# Patient Record
(demographics unavailable — no encounter records)

---

## 2024-11-15 NOTE — DISCUSSION/SUMMARY
[FreeTextEntry1] : stable exam HTN stable on meds/ lifestyle changes Lipids stable on statin palpitations stable, controlled [EKG obtained to assist in diagnosis and management of assessed problem(s)] : EKG obtained to assist in diagnosis and management of assessed problem(s)

## 2024-11-15 NOTE — PHYSICAL EXAM
[Well Developed] : well developed [Well Nourished] : well nourished [No Acute Distress] : no acute distress [Normal Conjunctiva] : normal conjunctiva [Normal Venous Pressure] : normal venous pressure [No Carotid Bruit] : no carotid bruit [Normal S1, S2] : normal S1, S2 [No Murmur] : no murmur [No Rub] : no rub [No Gallop] : no gallop [Clear Lung Fields] : clear lung fields [Good Air Entry] : good air entry [No Respiratory Distress] : no respiratory distress  [Soft] : abdomen soft [Non Tender] : non-tender [No Masses/organomegaly] : no masses/organomegaly [Normal Bowel Sounds] : normal bowel sounds [Normal Gait] : normal gait [No Edema] : no edema [No Cyanosis] : no cyanosis [No Clubbing] : no clubbing [No Rash] : no rash [Moves all extremities] : moves all extremities [No Focal Deficits] : no focal deficits [Normal Speech] : normal speech [Alert and Oriented] : alert and oriented [Normal memory] : normal memory 25

## 2025-04-05 NOTE — ASSESSMENT
[FreeTextEntry1] : 68 yo female with h/o HTN, HLD, palpitations, mild valvular disease here to re-establish cardiac care.  #Chest pain - atypical, dyspnea  --CP appears pleuritic in nature- s/p recent treatment for PNA/URI --continued conservative therapies for cough --will re-assess next visit and consider testing if persistence --hydration as tolerated emphasized --no sig CAD per CCTA in 03/2024 --continued CV Risk factor modification   # Hypertension -  borderline elevated today - continue current anti-hypertensives - home BP monitoring encouraged - continued exercise as tolerated - Low salt diet  # Hyperlipidemia -  Sub-optimally Controlled, Aim for a LDL of <100 - Continue current prescribed medications inc crestor; will up-titrate as needed - Low fat low cholesterol diet - Heart healthy diet emphasized - Continued exercise as tolerated  Follow up in 3 months to re-assess symptoms

## 2025-04-05 NOTE — HISTORY OF PRESENT ILLNESS
[FreeTextEntry1] : 68 yo female with h/o HTN, HLD, palpitations, mild valvular disease here to re-establish cardiac care. She reports being in [place] two weeks ago. She notes working in the heat while she was there and chemical exposure that left her feeling sick. Last Friday, she felt shaky, palpitations and tremors while she was working outside. She checked her pulse it was in the 90s all day. Following that she had a hoarse voice and palpitations, that was treated with antibiotics and albuterol in [place]. Since she returned to the [place], she has not had any further symptoms. Her HR returned to 40s-50s which is normal for her. She notes a chest discomfort that is more noticeable when she takes a deep breath in and increased shortness of breath. In addition, she has been holding ozempic for the last 2 weeks. Denies any significant chest pain/dyspnea on exertion, palpitations, orthopnea, LE edema, PND or syncope.   Cardiac Workup: EKG today: sinus nazanin 48bpm; no sig ST-T changes, unchanged from prior TTE 01/2024: normal LV size/function. LVEF 61%, normal RV, no pulm HTN, mild MR Stress test 01/2024: Abnormal stress electrocardiography. 1 mm horizontal ST depressions in II, III, aVF. Abnormal stress echocardiography. Hypokinesis in the basal to mid inferolateral and basal to mid anterolateral regions immediate post-stress. Exercise time 8 min 35 sec (10.1 METs), 96% MPHR achieved. CCTA 03/2024: calcium score 0; no sig CAD, 3.5cm hiatal hernia Lipids: Tchol 210, HDL 52, ,

## 2025-04-05 NOTE — HISTORY OF PRESENT ILLNESS
[FreeTextEntry1] : 70 yo female with h/o HTN, HLD, palpitations, mild valvular disease here to re-establish cardiac care. She reports being in [place] two weeks ago. She notes working in the heat while she was there and chemical exposure that left her feeling sick. Last Friday, she felt shaky, palpitations and tremors while she was working outside. She checked her pulse it was in the 90s all day. Following that she had a hoarse voice and palpitations, that was treated with antibiotics and albuterol in [place]. Since [place] the [place], she has not had any further symptoms. Her HR returned to 40s-50s which is normal for her. She notes a chest discomfort that is more noticeable when she takes a deep breath in and increased shortness of breath. In addition, she has been holding ozempic for the last 2 weeks. Denies any significant chest pain/dyspnea on exertion, palpitations, orthopnea, LE edema, PND or syncope.   Cardiac Workup: EKG today: sinus nazanin 48bpm; no sig ST-T changes, unchanged from prior TTE 01/2024: normal LV size/function. LVEF 61%, normal RV, no pulm HTN, mild MR Stress test 01/2024: Abnormal stress electrocardiography. 1 mm horizontal ST depressions in II, III, aVF. Abnormal stress echocardiography. Hypokinesis in the basal to mid inferolateral and basal to mid anterolateral regions immediate post-stress. Exercise time 8 min 35 sec (10.1 METs), 96% MPHR achieved. CCTA 03/2024: calcium score 0; no sig CAD, 3.5cm hiatal hernia Lipids: Tchol 210, HDL 52, ,

## 2025-04-05 NOTE — PHYSICAL EXAM
[Well Developed] : well developed [Well Nourished] : well nourished [No Acute Distress] : no acute distress [Normal Conjunctiva] : normal conjunctiva [Normal Venous Pressure] : normal venous pressure [No Carotid Bruit] : no carotid bruit [Normal S1, S2] : normal S1, S2 [No Murmur] : no murmur [No Rub] : no rub [No Gallop] : no gallop [Clear Lung Fields] : clear lung fields [Good Air Entry] : good air entry [No Respiratory Distress] : no respiratory distress  [Soft] : abdomen soft [Non Tender] : non-tender [Normal Bowel Sounds] : normal bowel sounds [Normal Gait] : normal gait [No Edema] : no edema [No Cyanosis] : no cyanosis [No Varicosities] : no varicosities [No Rash] : no rash [No Skin Lesions] : no skin lesions [Moves all extremities] : moves all extremities [No Focal Deficits] : no focal deficits [Normal Speech] : normal speech [Alert and Oriented] : alert and oriented [Normal memory] : normal memory

## 2025-04-05 NOTE — ASSESSMENT
[FreeTextEntry1] : 70 yo female with h/o HTN, HLD, palpitations, mild valvular disease here to re-establish cardiac care.  #Chest pain - atypical, dyspnea  --CP appears pleuritic in nature- s/p recent treatment for PNA/URI --continued conservative therapies for cough --will re-assess next visit and consider testing if persistence --hydration as tolerated emphasized --no sig CAD per CCTA in 03/2024 --continued CV Risk factor modification   # Hypertension -  borderline elevated today - continue current anti-hypertensives - home BP monitoring encouraged - continued exercise as tolerated - Low salt diet  # Hyperlipidemia -  Sub-optimally Controlled, Aim for a LDL of <100 - Continue current prescribed medications inc crestor; will up-titrate as needed - Low fat low cholesterol diet - Heart healthy diet emphasized - Continued exercise as tolerated  Follow up in 3 months to re-assess symptoms

## 2025-04-25 NOTE — PHYSICAL EXAM
[Well Developed] : well developed [Well Nourished] : well nourished [No Acute Distress] : no acute distress [Normal Conjunctiva] : normal conjunctiva [Normal Venous Pressure] : normal venous pressure [No Carotid Bruit] : no carotid bruit [No Murmur] : no murmur [No Rub] : no rub [No Gallop] : no gallop [Tachycardia] : tachycardic [Irregularly Irregular] : irregularly irregular [Clear Lung Fields] : clear lung fields [Good Air Entry] : good air entry [No Respiratory Distress] : no respiratory distress  [Soft] : abdomen soft [Non Tender] : non-tender [Normal Bowel Sounds] : normal bowel sounds [Normal Gait] : normal gait [No Edema] : no edema [No Cyanosis] : no cyanosis [No Varicosities] : no varicosities [No Rash] : no rash [No Skin Lesions] : no skin lesions [Moves all extremities] : moves all extremities [No Focal Deficits] : no focal deficits [Normal Speech] : normal speech [Alert and Oriented] : alert and oriented [Normal memory] : normal memory [Soft, Nontender] : the abdomen was soft and nontender

## 2025-04-25 NOTE — HISTORY OF PRESENT ILLNESS
[FreeTextEntry1] : 70 yo female with h/o newly diagnosed paroxysmal AF, HTN, HLD, DM II, palpitations, mild valvular disease here for follow up cardiac care. Dr. DAVINA Montelongo present at the time of patient examination, Dr. Argueta notified Recently started on Eliquis for pAF earlier this week (0.1% on Holter, longest episode 1.2m, max HR 152bpm - [id number] of 4); she began with nausea and headache x 2 days, yesterday worsening headache, nausea and vomiting Went to the ER at [facility] last night, was hydrated, had EKG and head CT which were deemed unremarkable Now today had one episode of vomiting at 7am, vomited x 1 here in the office (no hematemesis)  She reports fatigue, palpitations and nausea Denies hematemesis or blood in the stool, last BM was last night and patient noted that it was normal Denies abdominal pain or cramping, no abdominal distention or TTP. No diarrhea or constipation. No chest pain, dizziness or focal weakness. States that headache has persisted, however is improved from yesterday In addition, she has been holding ozempic for the last 2 weeks, her endocrinologist was tapering it down to wean her off if it  Patient has prior hx of chemical exposure that left her feeling sick while in [place], she reported prior symptoms of feeling shaky, palpitations and tremors while she was working outside. She checked her pulse it was in the 90s all day. Following that she had a hoarse voice and palpitations, that was treated with antibiotics and albuterol in [place]. Since she returned to the [place], and her HR returned to 40s-50s which is normal for her. She notes a chest discomfort that is more noticeable when she takes a deep breath in and increased shortness of breath. Denies any significant chest pain/dyspnea on exertion, orthopnea, LE edema, PND or syncope.   Cardiac Workup: EKG today: sinus nazanin 48bpm; no sig ST-T changes, unchanged from prior TTE 01/2024: normal LV size/function. LVEF 61%, normal RV, no pulm HTN, mild MR Stress test 01/2024: Abnormal stress electrocardiography. 1 mm horizontal ST depressions in II, III, aVF. Abnormal stress echocardiography. Hypokinesis in the basal to mid inferolateral and basal to mid anterolateral regions immediate post-stress. Exercise time 8 min 35 sec (10.1 METs), 96% MPHR achieved. CCTA 03/2024: calcium score 0; no sig CAD, 3.5cm hiatal hernia Lipids: Tchol 210, HDL 52, ,

## 2025-04-25 NOTE — ASSESSMENT
[FreeTextEntry1] : 68 yo female with h/o newly diagnosed paroxysmal AF, HTN, HLD, DM II, palpitations, mild valvular disease here for follow up cardiac care. Dr. Montelongo present at the time of patient examination, Dr. Argueta notified  #AF w/ RVR --pt hemodynamically stable, repeat /80, alert and responsive, normal gait --recommended ambulance transport, patient declined and preferred her  to drive her to the ED --patient preferred [facility] for further evaluation and management, report given to ED triage --likely in the setting of vomiting/dehydration --c/w NOAC stroke prophylaxis given [id number] of 4  #Hx Chest pain that was atypical w/ dyspnea, none today --no sig CAD per CCTA in 03/2024 --continued CV Risk factor modification   # Hypertension - continue current anti-hypertensives - home BP monitoring encouraged - continued exercise as tolerated - Low salt diet  # Hyperlipidemia - Sub-optimally Controlled, Aim for a LDL of <100 - Continue Crestor, now at 10mg daily - recheck lipids in 2-3 months - Low fat low cholesterol diet - Heart healthy diet emphasized - Continued exercise as tolerated  ER evaluation today Follow up with Dr. Argueta on 5/7/25 EP consult on 5/9/25

## 2025-04-25 NOTE — REVIEW OF SYSTEMS
[Negative] : Neurological [Nausea] : nausea [Vomiting] : vomiting [Abdominal Pain] : no abdominal pain [Heartburn] : no heartburn [Change in Appetite] : no change in appetite [Change In The Stool] : no change in stool [Dysphagia] : no dysphagia [Diarrhea] : diarrhea [Constipation] : no constipation [Blood in Stool] : no blood in stool [FreeTextEntry5] : as per HPI

## 2025-05-04 NOTE — PHYSICAL EXAM
[Well Developed] : well developed [Well Nourished] : well nourished [No Acute Distress] : no acute distress [Normal Conjunctiva] : normal conjunctiva [Normal Venous Pressure] : normal venous pressure [No Carotid Bruit] : no carotid bruit [No Murmur] : no murmur [No Rub] : no rub [No Gallop] : no gallop [Tachycardia] : tachycardic [Irregularly Irregular] : irregularly irregular [Clear Lung Fields] : clear lung fields [Good Air Entry] : good air entry [No Respiratory Distress] : no respiratory distress  [Non Tender] : non-tender [Soft] : abdomen soft [Normal Bowel Sounds] : normal bowel sounds [Soft, Nontender] : the abdomen was soft and nontender [Normal Gait] : normal gait [No Edema] : no edema [No Cyanosis] : no cyanosis [No Varicosities] : no varicosities [No Rash] : no rash [No Skin Lesions] : no skin lesions [Moves all extremities] : moves all extremities [No Focal Deficits] : no focal deficits [Alert and Oriented] : alert and oriented [Normal Speech] : normal speech [Normal memory] : normal memory

## 2025-05-04 NOTE — PHYSICAL EXAM
[Well Developed] : well developed [Well Nourished] : well nourished [No Acute Distress] : no acute distress [Normal Conjunctiva] : normal conjunctiva [Normal Venous Pressure] : normal venous pressure [No Carotid Bruit] : no carotid bruit [No Murmur] : no murmur [No Rub] : no rub [No Gallop] : no gallop [Tachycardia] : tachycardic [Irregularly Irregular] : irregularly irregular [Clear Lung Fields] : clear lung fields [Good Air Entry] : good air entry [No Respiratory Distress] : no respiratory distress  [Soft] : abdomen soft [Non Tender] : non-tender [Normal Bowel Sounds] : normal bowel sounds [Soft, Nontender] : the abdomen was soft and nontender [Normal Gait] : normal gait [No Edema] : no edema [No Cyanosis] : no cyanosis [No Varicosities] : no varicosities [No Rash] : no rash [No Skin Lesions] : no skin lesions [Moves all extremities] : moves all extremities [No Focal Deficits] : no focal deficits [Alert and Oriented] : alert and oriented [Normal Speech] : normal speech [Normal memory] : normal memory

## 2025-05-08 NOTE — ASSESSMENT
[FreeTextEntry1] : 70 yo female with h/o newly diagnosed paroxysmal AF, HTN, HLD, DM II, palpitations, mild valvular disease here for follow up cardiac care.  #Paroxysmal atrial fibrillation --continue beta blocker  --c/w NOAC stroke prophylaxis given [id number] of 4 --overall low burden, however had additional episodes of A.fib with RVR since the holter --EP eval pending --recommended to stop ozempic given GI symptoms since initiation   # Hypertension - well controlled - continue current anti-hypertensives - home BP monitoring encouraged - continued exercise as tolerated - Low salt diet  # Hyperlipidemia - Sub-optimally Controlled, Aim for a LDL of <100 - Continue Crestor, now at 10mg daily - recheck lipids prior to next visit - Low fat low cholesterol diet emphasized - Heart healthy, Mediterranean diet discussed - Continued exercise as tolerated  Follow up in 3 months with labs

## 2025-05-08 NOTE — ASSESSMENT
[FreeTextEntry1] : 70 yo female with h/o newly diagnosed paroxysmal AF, HTN, HLD, DM II, palpitations, mild valvular disease here for follow up cardiac care.  #Paroxysmal atrial fibrillation --continue beta blocker  --c/w NOAC stroke prophylaxis given [id number] of 4 --overall low burden, however had additional episodes of A.fib with RVR since the holter --EP eval pending --recommended to stop ozempic given GI symptoms since initiation   # Hypertension - well controlled - continue current anti-hypertensives - home BP monitoring encouraged - continued exercise as tolerated - Low salt diet  # Hyperlipidemia - Sub-optimally Controlled, Aim for a LDL of <100 - Continue Crestor, now at 10mg daily - recheck lipids prior to next visit - Low fat low cholesterol diet emphasized - Heart healthy, Mediterranean diet discussed - Continued exercise as tolerated  Follow up in 3 months with labs

## 2025-05-08 NOTE — REVIEW OF SYSTEMS
[Nausea] : nausea [Vomiting] : vomiting [Negative] : Neurological [Abdominal Pain] : no abdominal pain [Heartburn] : no heartburn [Change in Appetite] : no change in appetite [Change In The Stool] : no change in stool [Dysphagia] : no dysphagia [Diarrhea] : diarrhea [Constipation] : no constipation [Blood in Stool] : no blood in stool [FreeTextEntry5] : as per HPI

## 2025-05-08 NOTE — HISTORY OF PRESENT ILLNESS
[FreeTextEntry1] : 68 yo female with h/o newly diagnosed paroxysmal AF, HTN, HLD, DM II, palpitations, mild valvular disease here for follow up cardiac care. Recently started on Eliquis/BB for pAF earlier this month after she had symptoms during a recent visit to [place].  In the interim she has been having nausea/vomiting that triggers A.fib with RVR. Last seen in the office a week ago after she thought her nausea/vomiting was 2/2 eliquis. She was sent to the ER for A.fib with RVR where she converted after 1 dose of diltiazem. She returns for follow up. She has stopped taking ozempic. Seems to be helping her GI symptoms of N/V. Underwent CT A/P while in the ER - notable for colitis. Discharged without antibiotics. Feels improved; denies any fevers, chills or GI symptoms. She has resumed eliquis and is tolerating. Was recommended to increase beta blocker; however has not done it due to low resting HR (in the 40s-50s per patient). Scheduled to see EP next week. Denies any significant chest pain/dyspnea on exertion, orthopnea, LE edema, PND or syncope.    Cardiac Workup: EKG today: sinus nazanin 48bpm; no sig ST-T changes, unchanged from prior TTE 01/2024: normal LV size/function. LVEF 61%, normal RV, no pulm HTN, mild MR Stress test 01/2024: Abnormal stress electrocardiography. 1 mm horizontal ST depressions in II, III, aVF. Abnormal stress echocardiography. Hypokinesis in the basal to mid inferolateral and basal to mid anterolateral regions immediate post-stress. Exercise time 8 min 35 sec (10.1 METs), 96% MPHR achieved. CCTA 03/2024: calcium score 0; no sig CAD, 3.5cm hiatal hernia Lipids 03/2025: Tchol 201, HDL 52, ,  holter: 0.1% A.fib on Holter, longest episode 1.2m, max HR 152bpm - [id number] of 4

## 2025-05-08 NOTE — REVIEW OF SYSTEMS
[Nausea] : nausea [Vomiting] : vomiting [Negative] : Neurological [Abdominal Pain] : no abdominal pain [Heartburn] : no heartburn [Change in Appetite] : no change in appetite [Dysphagia] : no dysphagia [Change In The Stool] : no change in stool [Diarrhea] : diarrhea [Constipation] : no constipation [Blood in Stool] : no blood in stool [FreeTextEntry5] : as per HPI

## 2025-05-08 NOTE — HISTORY OF PRESENT ILLNESS
Spoke to pt and notified pt of appt date and time for HFU and chemo clear         ----- Message from MARCELO Colmenares sent at 3/24/2025  3:33 PM CDT -----  Regarding: FW: hosp follow up  Scheduled. Please call her to let her know. Labs this Friday and visit next Monday.  ----- Message -----  From: Wilma Knapp RN  Sent: 3/24/2025   2:18 PM CDT  To: [facility] Staff  Subject: hosp follow up                                   Good afternoon!Patient is discharging from [facility] today and needs a hospital follow up.Please reach out to patient to schedule.Thank you!   [FreeTextEntry1] : 68 yo female with h/o newly diagnosed paroxysmal AF, HTN, HLD, DM II, palpitations, mild valvular disease here for follow up cardiac care. Recently started on Eliquis/BB for pAF earlier this month after she had symptoms during a recent visit to [place].  In the interim she has been having nausea/vomiting that triggers A.fib with RVR. Last seen in the office a week ago after she thought her nausea/vomiting was 2/2 eliquis. She was sent to the ER for A.fib with RVR where she converted after 1 dose of diltiazem. She returns for follow up. She has stopped taking ozempic. Seems to be helping her GI symptoms of N/V. Underwent CT A/P while in the ER - notable for colitis. Discharged without antibiotics. Feels improved; denies any fevers, chills or GI symptoms. She has resumed eliquis and is tolerating. Was recommended to increase beta blocker; however has not done it due to low resting HR (in the 40s-50s per patient). Scheduled to see EP next week. Denies any significant chest pain/dyspnea on exertion, orthopnea, LE edema, PND or syncope.    Cardiac Workup: EKG today: sinus nazanin 48bpm; no sig ST-T changes, unchanged from prior TTE 01/2024: normal LV size/function. LVEF 61%, normal RV, no pulm HTN, mild MR Stress test 01/2024: Abnormal stress electrocardiography. 1 mm horizontal ST depressions in II, III, aVF. Abnormal stress echocardiography. Hypokinesis in the basal to mid inferolateral and basal to mid anterolateral regions immediate post-stress. Exercise time 8 min 35 sec (10.1 METs), 96% MPHR achieved. CCTA 03/2024: calcium score 0; no sig CAD, 3.5cm hiatal hernia Lipids 03/2025: Tchol 201, HDL 52, ,  holter: 0.1% A.fib on Holter, longest episode 1.2m, max HR 152bpm - [id number] of 4

## 2025-05-23 NOTE — HISTORY OF PRESENT ILLNESS
[FreeTextEntry1] : Ms. YOVANA KAT was seen today at the [facility]. She is a 69 year old woman that was referred for evaluation of symptomatic paroxysmal atrial fibrillation.  She was referred by Dr Argueta after she presented with symptomatic palpitations and atrial fibrillation.  Past medical history is significant for type II DM and asthma.  Today she feels well.  Past Medical History DM Asthma, last prednisone taper 2 years ago, never intubated  [facility] Breast cyst removal- benign Cataract surgery  Review of Systems: Constitutional: Negative.  HENT: Negative.  Eyes: Negative.  Respiratory: Negative.  Cardiovascular: Negative.  Gastrointestinal: Negative.  Endocrine: Negative.  Genitourinary: Negative.  Musculoskeletal: Negative.  Skin: Negative.  Allergic/Immunologic: Negative.  Neurological: Negative.  Hematological: Negative.  Psychiatric/Behavioral: Negative.  Constitutional: WN WD WF NAD Eyes: Sclera white. PERRLA. EOMI. ENMT: No obvious oral lesions. Oropharynx clear. No palpable neck masses. Extremities: No C/C/E. CV: No JVD or carotid bruit. PMI nl. S1S2 RRR No M/R/H/G Lungs: CTA & P Abd: +BS, NT/ND no HSM : Deferred Skin: no lesions or rash Neuro: A&Ox3, non focal Psych: no abnl behaviors during exam  ECG- NSR, normal axis, normal intervals.  ECG in office 4/2024- AF with RVR and rate up to 154bpm   Echo 2024 1. Left ventricular cavity is normal. Left ventricular wall thickness is normal. Left ventricular systolic function is normal with an ejection fraction of 61 % by Hubbard's method of disks. There are no regional wall motion abnormalities seen. 2. Normal left ventricular diastolic function, with normal filling pressure. 3. Normal right ventricular cavity size, wall thickness, and normal systolic function. Tricuspid annular plane systolic excursion (TAPSE) is 2.3 cm (normal >=1.7 cm). 4. Mild mitral regurgitation. 5. No echocardiographic evidence of pulmonary hypertension. 6. No pericardial effusion seen.  CTA 2024-  Calcium score zero- normal coronaries.

## 2025-05-23 NOTE — DISCUSSION/SUMMARY
[FreeTextEntry1] : Impression 1. Paroxysmal AF- symptomatic 2. DM 3. Obesity- previously on Ozempic- now DC'd 4. Asthma 5. CHADSVASC 3  Plan Ms. Kat suffers from PAF.  She is currently on betablocker and eliquis therapies and continues to report recurrent symptoms.  I reviewed all options for treatment of AF with Ms. Kat and her .  We discussed treatment with additonal antiarrhythmic therapy and catheter ablation.    I reviewed the benefits, risks, complications and anticipated efficacy rates associated with left atrial catheter ablation.  Ms. YOVANA KAT is aware that left atrial catheter ablation with pulmonary vein electrical isolation has a success rate of approximately 70%. In addition a redo procedure to eliminate recurrent atrial arrhythmia is frequently required. We discussed the complications associated with this procedure including, stroke, AV block requiring permanent pacemaker, myocardial infarction, death, tamponade, PV stenosis, left atral flutter, phrenic nerve damage, left atrial esophageal fistula, vascular complications, bleeding and infection.  Following our discussion the patient was given a 21 page booklet published by the heart rhythm society, detailing all aspects of AF treatment.    Following our discussion she agreed to proceed with ablation on Monday July 7th. [EKG obtained to assist in diagnosis and management of assessed problem(s)] : EKG obtained to assist in diagnosis and management of assessed problem(s)